# Patient Record
Sex: MALE | Race: WHITE | HISPANIC OR LATINO | URBAN - METROPOLITAN AREA
[De-identification: names, ages, dates, MRNs, and addresses within clinical notes are randomized per-mention and may not be internally consistent; named-entity substitution may affect disease eponyms.]

---

## 2024-09-09 ENCOUNTER — EMERGENCY (EMERGENCY)
Facility: HOSPITAL | Age: 24
LOS: 1 days | Discharge: HOME | End: 2024-09-09
Attending: EMERGENCY MEDICINE | Admitting: EMERGENCY MEDICINE
Payer: SELF-PAY

## 2024-09-09 VITALS
HEART RATE: 99 BPM | OXYGEN SATURATION: 99 % | SYSTOLIC BLOOD PRESSURE: 108 MMHG | HEIGHT: 65 IN | RESPIRATION RATE: 16 BRPM | DIASTOLIC BLOOD PRESSURE: 68 MMHG | WEIGHT: 220.02 LBS | TEMPERATURE: 98 F

## 2024-09-09 LAB
ALBUMIN SERPL ELPH-MCNC: 3.6 G/DL — SIGNIFICANT CHANGE UP (ref 3.3–5)
ALP SERPL-CCNC: 103 U/L — SIGNIFICANT CHANGE UP (ref 40–120)
ALT FLD-CCNC: 68 U/L — SIGNIFICANT CHANGE UP (ref 12–78)
ANION GAP SERPL CALC-SCNC: 6 MMOL/L — SIGNIFICANT CHANGE UP (ref 5–17)
APAP SERPL-MCNC: <2 UG/ML — SIGNIFICANT CHANGE UP (ref 10–30)
AST SERPL-CCNC: 34 U/L — SIGNIFICANT CHANGE UP (ref 15–37)
BASOPHILS # BLD AUTO: 0.09 K/UL — SIGNIFICANT CHANGE UP (ref 0–0.2)
BASOPHILS NFR BLD AUTO: 0.5 % — SIGNIFICANT CHANGE UP (ref 0–2)
BILIRUB SERPL-MCNC: 0.3 MG/DL — SIGNIFICANT CHANGE UP (ref 0.2–1.2)
BUN SERPL-MCNC: 12 MG/DL — SIGNIFICANT CHANGE UP (ref 7–23)
CALCIUM SERPL-MCNC: 9 MG/DL — SIGNIFICANT CHANGE UP (ref 8.5–10.1)
CHLORIDE SERPL-SCNC: 110 MMOL/L — HIGH (ref 96–108)
CO2 SERPL-SCNC: 25 MMOL/L — SIGNIFICANT CHANGE UP (ref 22–31)
CREAT SERPL-MCNC: 0.89 MG/DL — SIGNIFICANT CHANGE UP (ref 0.5–1.3)
EGFR: 122 ML/MIN/1.73M2 — SIGNIFICANT CHANGE UP
EOSINOPHIL # BLD AUTO: 0.24 K/UL — SIGNIFICANT CHANGE UP (ref 0–0.5)
EOSINOPHIL NFR BLD AUTO: 1.4 % — SIGNIFICANT CHANGE UP (ref 0–6)
ETHANOL SERPL-MCNC: 131 MG/DL — HIGH (ref 0–10)
GLUCOSE SERPL-MCNC: 83 MG/DL — SIGNIFICANT CHANGE UP (ref 70–99)
HCT VFR BLD CALC: 43 % — SIGNIFICANT CHANGE UP (ref 39–50)
HGB BLD-MCNC: 13.4 G/DL — SIGNIFICANT CHANGE UP (ref 13–17)
IMM GRANULOCYTES NFR BLD AUTO: 0.6 % — SIGNIFICANT CHANGE UP (ref 0–0.9)
LYMPHOCYTES # BLD AUTO: 24.5 % — SIGNIFICANT CHANGE UP (ref 13–44)
LYMPHOCYTES # BLD AUTO: 4.21 K/UL — HIGH (ref 1–3.3)
MCHC RBC-ENTMCNC: 30.1 PG — SIGNIFICANT CHANGE UP (ref 27–34)
MCHC RBC-ENTMCNC: 31.2 GM/DL — LOW (ref 32–36)
MCV RBC AUTO: 96.6 FL — SIGNIFICANT CHANGE UP (ref 80–100)
MONOCYTES # BLD AUTO: 1.32 K/UL — HIGH (ref 0–0.9)
MONOCYTES NFR BLD AUTO: 7.7 % — SIGNIFICANT CHANGE UP (ref 2–14)
NEUTROPHILS # BLD AUTO: 11.2 K/UL — HIGH (ref 1.8–7.4)
NEUTROPHILS NFR BLD AUTO: 65.3 % — SIGNIFICANT CHANGE UP (ref 43–77)
NRBC # BLD: 0 /100 WBCS — SIGNIFICANT CHANGE UP (ref 0–0)
PCP SPEC-MCNC: SIGNIFICANT CHANGE UP
PLATELET # BLD AUTO: 457 K/UL — HIGH (ref 150–400)
POTASSIUM SERPL-MCNC: 3.7 MMOL/L — SIGNIFICANT CHANGE UP (ref 3.5–5.3)
POTASSIUM SERPL-SCNC: 3.7 MMOL/L — SIGNIFICANT CHANGE UP (ref 3.5–5.3)
PROT SERPL-MCNC: 7.8 G/DL — SIGNIFICANT CHANGE UP (ref 6–8.3)
RBC # BLD: 4.45 M/UL — SIGNIFICANT CHANGE UP (ref 4.2–5.8)
RBC # FLD: 14.8 % — HIGH (ref 10.3–14.5)
SALICYLATES SERPL-MCNC: <1.7 MG/DL — LOW (ref 2.8–20)
SODIUM SERPL-SCNC: 141 MMOL/L — SIGNIFICANT CHANGE UP (ref 135–145)
WBC # BLD: 17.16 K/UL — HIGH (ref 3.8–10.5)
WBC # FLD AUTO: 17.16 K/UL — HIGH (ref 3.8–10.5)

## 2024-09-09 PROCEDURE — 70450 CT HEAD/BRAIN W/O DYE: CPT | Mod: MC

## 2024-09-09 PROCEDURE — 99285 EMERGENCY DEPT VISIT HI MDM: CPT

## 2024-09-09 PROCEDURE — 74177 CT ABD & PELVIS W/CONTRAST: CPT | Mod: 26,MC

## 2024-09-09 PROCEDURE — 96372 THER/PROPH/DIAG INJ SC/IM: CPT | Mod: XU

## 2024-09-09 PROCEDURE — 85025 COMPLETE CBC W/AUTO DIFF WBC: CPT

## 2024-09-09 PROCEDURE — 99285 EMERGENCY DEPT VISIT HI MDM: CPT | Mod: 25

## 2024-09-09 PROCEDURE — 36415 COLL VENOUS BLD VENIPUNCTURE: CPT

## 2024-09-09 PROCEDURE — 70450 CT HEAD/BRAIN W/O DYE: CPT | Mod: 26,MC

## 2024-09-09 PROCEDURE — 80307 DRUG TEST PRSMV CHEM ANLYZR: CPT

## 2024-09-09 PROCEDURE — 99053 MED SERV 10PM-8AM 24 HR FAC: CPT

## 2024-09-09 PROCEDURE — 80053 COMPREHEN METABOLIC PANEL: CPT

## 2024-09-09 PROCEDURE — 74177 CT ABD & PELVIS W/CONTRAST: CPT | Mod: MC

## 2024-09-09 RX ORDER — OLANZAPINE 7.5 MG/1
10 TABLET ORAL ONCE
Refills: 0 | Status: DISCONTINUED | OUTPATIENT
Start: 2024-09-09 | End: 2024-09-09

## 2024-09-09 RX ORDER — HALOPERIDOL 1 MG
5 TABLET ORAL ONCE
Refills: 0 | Status: COMPLETED | OUTPATIENT
Start: 2024-09-09 | End: 2024-09-09

## 2024-09-09 RX ORDER — LORAZEPAM 4 MG/ML
2 INJECTION INTRAMUSCULAR; INTRAVENOUS ONCE
Refills: 0 | Status: DISCONTINUED | OUTPATIENT
Start: 2024-09-09 | End: 2024-09-09

## 2024-09-09 RX ADMIN — Medication 5 MILLIGRAM(S): at 05:54

## 2024-09-09 RX ADMIN — LORAZEPAM 2 MILLIGRAM(S): 4 INJECTION INTRAMUSCULAR; INTRAVENOUS at 05:55

## 2024-09-09 NOTE — ED ADULT NURSE REASSESSMENT NOTE - NS ED NURSE REASSESS COMMENT FT1
attempted to wake patient up to provided discharge instructions. pt is arousable to repeat stimuli but is falling asleep between care. discharge postponed due to lethargy.

## 2024-09-09 NOTE — ED PROVIDER NOTE - PROGRESS NOTE DETAILS
agitation improved after IM medications labs and imaging explained, pt tolerating a meal here well, now sleeping, can discharge when awake and sober

## 2024-09-09 NOTE — ED ADULT NURSE REASSESSMENT NOTE - NS ED NURSE REASSESS COMMENT FT1
received report from night Rn. pt was being verbally aggressive at change of shift. pt received breakfast, and became calm after receiving meal. pt is sleeping in stretcher. once he is awake pt can be discharge.

## 2024-09-09 NOTE — ED PROVIDER NOTE - PATIENT PORTAL LINK FT
You can access the FollowMyHealth Patient Portal offered by [facility] by registering at the following website: [email or web address]. By joining [employer]’s FollowMyHealth portal, you will also be able to view your health information using other applications (apps) compatible with our system.

## 2024-09-09 NOTE — ED ADULT NURSE NOTE - CAS ELECT INFOMATION PROVIDED
pt is verbaly aggressive at time of discharge, refusing to sign dc paperwork states "I don't care what you have to say"/DC instructions

## 2024-09-09 NOTE — ED PROVIDER NOTE - NSFOLLOWUPINSTRUCTIONS_ED_ALL_ED_FT
1) Follow-up with your Primary Medical Doctor or referred doctor. Call today / next business day for prompt follow-up.  2) Return to Emergency room for any worsening or persistent pain, weakness, fever, or any other concerning symptoms.  3) See attached instruction sheets for additional information, including information regarding signs and symptoms to look out for, reasons to seek immediate care and other important instructions.  4) Follow-up with any specialists as discussed / noted as well.     What is alcohol intoxication? Alcohol intoxication is a harmful physical condition caused when you drink more alcohol than your body can handle. It is also called ethanol poisoning, or being drunk.    What do I need to know about recommended alcohol limits?    Men 21 to 64 years should limit alcohol to 2 drinks a day. Do not have more than 4 drinks in 1 day or more than 14 in 1 week.    All women, and men 65 or older should limit alcohol to 1 drink in a day. Do not have more than 3 drinks in 1 day or more than 7 in 1 week. No amount of alcohol is okay during pregnancy.  What are common signs and symptoms of alcohol intoxication?    Breath that smells like alcohol    Blackouts or seizures    Enlarged pupils, or eye movements that are faster than normal for you    Fast heartbeat and slow breaths    Loss of balance, or no ability to walk straight or stand still    Nausea and vomiting    Slurred or loud speech    Quick mood changes    Trouble at work or school, or risky behavior, such as unprotected sex or driving while intoxicated  How is alcohol intoxication treated? Your healthcare provider will ask about your use of alcohol. These questions may include how much, how often, and what kind of alcohol you drink. He or she may test your memory. Blood or urine samples may be tested for alcohol and for signs of liver, kidney, or heart damage. Treatment may include any of the following:    Medicines may be given to help you stay calm, control seizures, or prevent nausea and vomiting. You may also be given glucose or vitamin B1 if your levels are too low.    In brief intervention therapy, a healthcare provider helps you think about your alcohol use differently. He or she helps you set goals to decrease the amount of alcohol you drink. Therapy may continue after you leave the hospital.    Extra oxygen may be given if you are so intoxicated that you cannot breathe well on your own.  Where can I find more information?    Alcoholics Anonymous  Web Address: http://www.aa.org  Substance Abuse and Mental Health Services Administration (SAMHSA)  [address]  [place] [address]  Web Address: http://www.[facility].gov or https://dpt2.[facility].gov/treatment/  Call your local emergency number (911 in the [place]) if:    You have sudden trouble breathing or chest pain.    You have a seizure.    You feel sad enough to harm yourself or others.  When should I call my doctor?    You have hallucinations (you see or hear things that are not real).    You cannot stop vomiting.    You have questions or concerns about your condition or care.  CARE AGREEMENT:    You have the right to help plan your care. Learn about your health condition and how it may be treated. Discuss treatment options with your healthcare providers to decide what care you want to receive. You always have the right to refuse treatment.

## 2024-09-09 NOTE — ED PROVIDER NOTE - CLINICAL SUMMARY MEDICAL DECISION MAKING FREE TEXT BOX
23-year-old male history of diabetes brought in by EMS complaining about diarrhea for 7 weeks with associated back pain.  As per EMS patient was found outside the [facility] in [place] approximately 5 minutes prior to arrival patient became more agitated and aggressive.  Singing rap songs with profanity loudly in the ED. patient admits to drinking 2 beers tonight.  Admits to some marijuana use.  Denies any heroin cocaine use.  Prior to my evaluation yi rodas was called on the patient who was roaming around in the ED.    r/o colitis, ams workup vs psych component, labs, CT a/p, CT head

## 2024-09-09 NOTE — ED PROVIDER NOTE - OBJECTIVE STATEMENT
23-year-old male history of diabetes brought in by EMS complaining about diarrhea for 7 weeks with associated back pain.  As per EMS patient was found outside the [facility] in [place] approximately 5 minutes prior to arrival patient became more agitated and aggressive.  Singing rap songs with profanity loudly in the ED. patient admits to drinking 2 beers tonight.  Admits to some marijuana use.  Denies any heroin cocaine use.  Prior to my evaluation yi rodas was called on the patient who was roaming around in the ED.

## 2024-09-27 ENCOUNTER — EMERGENCY (EMERGENCY)
Facility: HOSPITAL | Age: 24
LOS: 1 days | Discharge: HOME | End: 2024-09-27
Attending: STUDENT IN AN ORGANIZED HEALTH CARE EDUCATION/TRAINING PROGRAM
Payer: SELF-PAY

## 2024-09-27 VITALS
SYSTOLIC BLOOD PRESSURE: 133 MMHG | RESPIRATION RATE: 18 BRPM | HEART RATE: 93 BPM | OXYGEN SATURATION: 97 % | TEMPERATURE: 98 F | DIASTOLIC BLOOD PRESSURE: 80 MMHG

## 2024-09-27 VITALS — WEIGHT: 279.99 LBS

## 2024-09-27 PROCEDURE — 99283 EMERGENCY DEPT VISIT LOW MDM: CPT

## 2024-09-27 PROCEDURE — 99282 EMERGENCY DEPT VISIT SF MDM: CPT

## 2024-09-27 PROCEDURE — 99053 MED SERV 10PM-8AM 24 HR FAC: CPT

## 2024-09-27 NOTE — ED ADULT NURSE NOTE - OBJECTIVE STATEMENT
Per triage , patient complaining of back pain pta but refusing to talk or answer questions from this RN.

## 2024-09-27 NOTE — ED PROVIDER NOTE - OBJECTIVE STATEMENT
23-year-old male patient presents the ED complaining of needing a place to stay.  Patient was brought in by EMS/[place] police after being found in the street sleeping.  Patient refusing answer questions to nurse.  Upon assessment, patient refusing to answer most questions, being combative.  After a 5-minute discussion, patient states he just wants a place to stay, denies any current complaints

## 2024-09-27 NOTE — ED ADULT TRIAGE NOTE - CHIEF COMPLAINT QUOTE
BIBEMS found sitting outside. Police called ems pt c/o of back pain but refuse to talk during vitals. Denies  SI/HI or drug use.

## 2024-09-27 NOTE — ED PROVIDER NOTE - PATIENT PORTAL LINK FT
You can access the FollowMyHealth Patient Portal offered by [facility] by registering at the following website: [email or web address]. By joining [employer]’s FollowMyHealth portal, you will also be able to view your health information using other applications (apps) compatible with our system.

## 2024-09-27 NOTE — ED PROVIDER NOTE - CLINICAL SUMMARY MEDICAL DECISION MAKING FREE TEXT BOX
23-year-old male patient presents to the ED after being brought in by EMS and police for being found on the streets.  Patient requesting a  placed to stay overnight.  Refusing any other questioning or exams.  Will discharge patient in the a.m.

## 2024-09-27 NOTE — ED PROVIDER NOTE - PHYSICAL EXAMINATION
Const: In no acute distress.   HEENT: Moist mucous membranes  Eyes: PERRLA. No scleral icterus. EOMI.  Cardiac: Regular rate and regular rhythm. +S1/S2.   Resp: breath sounds equal and clear bilaterally. No wheezes, rales or rhonchi.  Skin: No rashes, abrasions or lacerations.  Neuro: Moves all extremities symmetrically.